# Patient Record
Sex: MALE | Race: WHITE | NOT HISPANIC OR LATINO
[De-identification: names, ages, dates, MRNs, and addresses within clinical notes are randomized per-mention and may not be internally consistent; named-entity substitution may affect disease eponyms.]

---

## 2021-10-27 PROBLEM — Z00.00 ENCOUNTER FOR PREVENTIVE HEALTH EXAMINATION: Status: ACTIVE | Noted: 2021-10-27

## 2021-10-28 ENCOUNTER — APPOINTMENT (OUTPATIENT)
Dept: ORTHOPEDIC SURGERY | Facility: CLINIC | Age: 31
End: 2021-10-28
Payer: MEDICAID

## 2021-10-28 VITALS
BODY MASS INDEX: 24.96 KG/M2 | HEIGHT: 76 IN | WEIGHT: 205 LBS | DIASTOLIC BLOOD PRESSURE: 82 MMHG | HEART RATE: 81 BPM | SYSTOLIC BLOOD PRESSURE: 122 MMHG

## 2021-10-28 PROCEDURE — 99203 OFFICE O/P NEW LOW 30 MIN: CPT

## 2021-10-28 PROCEDURE — 73564 X-RAY EXAM KNEE 4 OR MORE: CPT | Mod: RT

## 2021-10-29 NOTE — DISCUSSION/SUMMARY
[de-identified] : 30 y/o male with bilateral knee pain/instability\par \par Patient presents for evaluation of bilateral knee pain and instability.  Recent right knee MRI shows horizontally oriented cleavage tear of the posterior horn and body of the medial meniscus with associated para meniscal cyst formation posteriorly which is consistent with prior imaging seen in 2015.  Given chronicity of findings of the meniscus injury acute surgical intervention is not necessarily required.  However, there is gross instability felt to the right knee on clinical testing of the anterior cruciate ligament despite MRI findings with evidence of some intact cruciate ligament fibers. Patient also has a history of contralateral knee ACL reconstruction with subsequent revision.  There appears to be instability to the left knee consistent with continued failure of reconstruction.\par \par We discussed obtaining imaging of the left knee including x-ray/MRI imaging to determine degree of internal derangement.  We also discussed potential need for GH1081 testing to determine degree of laxity on the knees bilaterally. \par \par Recommendations: NSAIDs/Ice as needed.  MRI x-ray left knee.\par \par Follow-up after MRI for KT-1000 testing and possible surgical consideration.

## 2021-10-29 NOTE — ADDENDUM
[FreeTextEntry1] : This note was written by Olga Charles on 10/28/2021 acting solely as a scribe for Dr. Ayo Garcia.\par \par All medical record entries made by the Scribe were at my, Dr. Ayo Garcia, direction and personally dictated by me on 10/28/2021. I have personally reviewed the chart and agree that the record accurately reflects my personal performance of the history, physical exam, assessment and plan.

## 2021-10-29 NOTE — HISTORY OF PRESENT ILLNESS
[de-identified] : 31 year old male presents today for evaluation of bilateral knee pain.  Reports that he has had right knee pain since the summer. He injured his knee surfing and felt a pop twisting his knee to ride a wave. He was seen by his PMD who obtained MRI and recommended that he see ortho. The pain is brought on with knee flexion, squatting and kneeling. Denies locking catching, numbness or tingling. He is not taking pain medication.  Of note, patient has had 2 ACL reconstruction to the left knee.  He reports continued instability and dysfunction to the left knee at this time.\par \par The patient's past medical history, past surgical history, medications and allergies were reviewed by me today with the patient and documented accordingly. In addition, the patient's family and social history, which were noncontributory to this visit, were reviewed also.

## 2021-10-29 NOTE — PHYSICAL EXAM
[de-identified] : Oriented to time, place, person\par Mood: Normal\par Affect: Normal\par Appearance: Healthy, well appearing, no acute distress.\par Gait: Normal\par Assistive Devices: None\par \par Right Knee Exam:\par \par Skin: Clean, dry, intact\par Inspection: No obvious malalignment, no masses, no swelling, no effusion\par Pulses: 2+ DP/PT pulses \par ROM: 0-135 degrees of flexion. No pain with deep knee flexion/extension.\par Tenderness: + MJLT. No LJLT. No pain over the patella facets. No pain to the quadriceps tendon. No pain to the patella tendon. No posterior knee tenderness.\par Stability: Stable to varus, valgus. IIB Lachman testing. +anterior drawer, negative posterior drawer.\par Strength: 5/5 Q/H/TA/GS/EHL, without atrophy\par Neuro: Intact to light touch throughout, DTRs normal\par Additional Tests: Negative Jerome's test, Negative patellar grind test \par \par Left Knee Exam:\par \par Skin: Clean, dry, intact\par Inspection: No obvious malalignment, no masses, no swelling, no effusion\par Pulses: 2+ DP/PT pulses \par ROM: 0-135 degrees of flexion. No pain with deep knee flexion/extension.\par Tenderness: + MJLT. No LJLT. No pain over the patella facets. No pain to the quadriceps tendon. No pain to the patella tendon. No posterior knee tenderness.\par Stability: Stable to varus, valgus. IIB Lachman testing. +anterior drawer, negative posterior drawer.\par Strength: 5/5 Q/H/TA/GS/EHL, without atrophy\par Neuro: Intact to light touch throughout, DTRs normal\par Additional Tests: Negative Jerome's test, Negative patellar grind test  [de-identified] : Images were reviewed from [facility] dated 9.20.2021\par \par 4 views of the right knee were obtained that show no acute fracture or dislocation. There is no medial, no lateral and no patellofemoral degenerative changes seen. Tibia tubicle ossicle consistent with Osgood-Schlatters disease and fibrous cortical defect in the posterior femoral condyle. \par \par MRI right knee dated 10.5.2021 shows horizontally oriented cleavage tear of the posterior horn and body of the medial meniscus with associated para meniscal cyst formation posteriorly. [similar to MRI from 2015]

## 2021-11-04 ENCOUNTER — APPOINTMENT (OUTPATIENT)
Dept: ORTHOPEDIC SURGERY | Facility: CLINIC | Age: 31
End: 2021-11-04

## 2021-11-04 ENCOUNTER — APPOINTMENT (OUTPATIENT)
Dept: ORTHOPEDIC SURGERY | Facility: CLINIC | Age: 31
End: 2021-11-04
Payer: MEDICAID

## 2021-11-04 PROCEDURE — 99214 OFFICE O/P EST MOD 30 MIN: CPT | Mod: 25

## 2021-11-04 PROCEDURE — 97750 PHYSICAL PERFORMANCE TEST: CPT

## 2021-11-05 NOTE — ADDENDUM
[FreeTextEntry1] : This note was written by Olga Charles on 11/04/2021 acting solely as a scribe for Dr. Ayo Garcia.\par \par All medical record entries made by the Scribe were at my, Dr. Ayo Garcia, direction and personally dictated by me on 11/04/2021. I have personally reviewed the chart and agree that the record accurately reflects my personal performance of the history, physical exam, assessment and plan.

## 2021-11-05 NOTE — PHYSICAL EXAM
[de-identified] : Oriented to time, place, person\par Mood: Normal\par Affect: Normal\par Appearance: Healthy, well appearing, no acute distress.\par Gait: Normal\par Assistive Devices: None\par \par Right Knee Exam:\par \par Skin: Clean, dry, intact\par Inspection: No obvious malalignment, no masses, no swelling, no effusion\par Pulses: 2+ DP/PT pulses \par ROM: 0-135 degrees of flexion. No pain with deep knee flexion/extension.\par Tenderness: + MJLT. No LJLT. No pain over the patella facets. No pain to the quadriceps tendon. No pain to the patella tendon. No posterior knee tenderness.\par Stability: Stable to varus, valgus. IIB Lachman testing. +anterior drawer, negative posterior drawer.\par Strength: 5/5 Q/H/TA/GS/EHL, without atrophy\par Neuro: Intact to light touch throughout, DTRs normal\par Additional Tests: Negative Jerome's test, Negative patellar grind test \par \par Left Knee Exam:\par \par Skin: Clean, dry, intact\par Inspection: No obvious malalignment, no masses, no swelling, no effusion\par Pulses: 2+ DP/PT pulses \par ROM: 0-135 degrees of flexion. No pain with deep knee flexion/extension.\par Tenderness: + MJLT. No LJLT. No pain over the patella facets. No pain to the quadriceps tendon. No pain to the patella tendon. No posterior knee tenderness.\par Stability: Stable to varus, valgus. IIB Lachman testing. +anterior drawer, negative posterior drawer.\par Strength: 5/5 Q/H/TA/GS/EHL, without atrophy\par Neuro: Intact to light touch throughout, DTRs normal\par Additional Tests: Negative Jerome's test, Negative patellar grind test  [de-identified] : Images were reviewed from [facility] dated 9.20.2021\par \par 4 views of the right knee were obtained that show no acute fracture or dislocation. There is no medial, no lateral and no patellofemoral degenerative changes seen. Tibia tubicle ossicle consistent with Osgood-Schlatters disease and fibrous cortical defect in the posterior femoral condyle. \par \par MRI right knee dated 10.5.2021 shows horizontally oriented cleavage tear of the posterior horn and body of the medial meniscus with associated para meniscal cyst formation posteriorly. [similar to MRI from 2015]

## 2021-11-05 NOTE — DISCUSSION/SUMMARY
[de-identified] : 32 y/o male with bilateral knee pain/instability\par \par Patient presents for K-1000 testing and review of results. Left max at 30 degress is 15.5 and right max at 30 degrees 8.5. Testing reveals that the left knee is significantly more unstable in compared to the right knee. Recent right knee MRI shows horizontally oriented cleavage tear of the posterior horn and body of the medial meniscus with associated para meniscal cyst formation posteriorly which is consistent with prior imaging seen in 2015.  However, right knee does continue to be unstable clinical examination and concerning for ACL injury.  Patient is still pending MRI of the left knee given presumed failure of reconstruction and chronic instability.\par \par Recommendations: NSAIDs/Ice as needed.  MRI x-ray left knee.\par \par Follow-up after MRI of left knee.

## 2021-11-05 NOTE — HISTORY OF PRESENT ILLNESS
[de-identified] : 31 year old male  presents today for evaluation of bilateral knee pain/instability and  testing. He has not been able to obtain MRI yet for the left knee. Reports recent right knee injury surfing and felt a pop twisting his knee to ride a wave. The pain is brought on with knee flexion, squatting and kneeling. Denies locking catching, numbness or tingling. He is not taking pain medication.  Of note, patient has had 2 ACL reconstruction to the left knee.  He reports continued instability and dysfunction to the left knee at this time. He is very active and will like to return to surfing and snowboarding without pain.

## 2021-11-22 ENCOUNTER — APPOINTMENT (OUTPATIENT)
Dept: ORTHOPEDIC SURGERY | Facility: CLINIC | Age: 31
End: 2021-11-22
Payer: MEDICAID

## 2021-11-22 DIAGNOSIS — M23.8X2 OTHER INTERNAL DERANGEMENTS OF LEFT KNEE: ICD-10-CM

## 2021-11-22 PROCEDURE — 99214 OFFICE O/P EST MOD 30 MIN: CPT

## 2021-11-24 PROBLEM — M23.8X2 DEFICIENCY OF ANTERIOR CRUCIATE LIGAMENT OF LEFT KNEE: Status: ACTIVE | Noted: 2021-10-29

## 2021-11-24 NOTE — PHYSICAL EXAM
[de-identified] : Oriented to time, place, person\par Mood: Normal\par Affect: Normal\par Appearance: Healthy, well appearing, no acute distress.\par Gait: Normal\par Assistive Devices: None\par \par Right Knee Exam:\par \par Skin: Clean, dry, intact\par Inspection: No obvious malalignment, no masses, no swelling, no effusion\par Pulses: 2+ DP/PT pulses \par ROM: 0-135 degrees of flexion. No pain with deep knee flexion/extension.\par Tenderness: + MJLT. No LJLT. No pain over the patella facets. No pain to the quadriceps tendon. No pain to the patella tendon. No posterior knee tenderness.\par Stability: Stable to varus, valgus. IIA Lachman testing. +anterior drawer, negative posterior drawer.\par Strength: 5/5 Q/H/TA/GS/EHL, without atrophy\par Neuro: Intact to light touch throughout, DTRs normal\par Additional Tests: Negative Jerome's test, Negative patellar grind test \par \par Left Knee Exam:\par \par Skin: Clean, dry, intact\par Inspection: No obvious malalignment, no masses, no swelling, no effusion\par Pulses: 2+ DP/PT pulses \par ROM: 0-135 degrees of flexion. No pain with deep knee flexion/extension.\par Tenderness: + MJLT. No LJLT. No pain over the patella facets. No pain to the quadriceps tendon. No pain to the patella tendon. No posterior knee tenderness.\par Stability: Stable to varus, valgus. IIB Lachman testing. +anterior drawer, negative posterior drawer.\par Strength: 5/5 Q/H/TA/GS/EHL, without atrophy\par Neuro: Intact to light touch throughout, DTRs normal\par Additional Tests: Negative Jerome's test, Negative patellar grind test  [de-identified] : Images were reviewed from [facility] dated 9.20.2021\par \par 4 views of the right knee were obtained that show no acute fracture or dislocation. There is no medial, no lateral and no patellofemoral degenerative changes seen. Tibia tubicle ossicle consistent with Osgood-Schlatters disease and fibrous cortical defect in the posterior femoral condyle. \par \par MRI right knee dated 10.5.2021 shows horizontally oriented cleavage tear of the posterior horn and body of the medial meniscus with associated para meniscal cyst formation posteriorly. [similar to MRI from 2015]\par \par MRI left knee dated 11.9.2021 shows s/p ACL reconstruction and chronic complete disruption of the ACL graft since the previous. Chronic radial tear of the posterior root of the medial meniscus with extrusion of body segment. Vertical tear of the anterior horn of lateral meniscus. Mild tricompartment arthrosis.

## 2021-11-24 NOTE — DISCUSSION/SUMMARY
[de-identified] : 30 y/o male with bilateral knee pain/instability\par \par Patient presents for MRI review.  MRI of the left knee shows chronic disruption of prior ACL reconstruction with complex tearing of the medial meniscus with mild tricompartmental arthrosis. K-1000 testing from last visit showed left max at 30 degress is 15.5 and right max at 30 degrees 8.5. Testing reveals that the left knee is significantly more unstable in compared to the right knee.  Right knee MRI shows horizontally oriented cleavage tear of the posterior horn and body of the medial meniscus but evidence of intact ACL.  We discussed that his instability felt on clinical examination of the right knee may be physiologic.  Given persistent symptoms within the right knee, we discussed potential need for diagnostic arthroscopy with partial meniscectomy and arthroscopic evaluation of the anterior cruciate ligament.  Patient would be agreeable to ACL reconstruction if felt to be significantly deficient at time of diagnostic arthroscopy.  Patient is electing to postpone any left knee intervention given degree of injury at this time.\par \par All risks, benefits and alternatives to the proposed surgical procedure, right knee diagnostic arthroscopy partial meniscectomy, as well as the need for formal post-operative rehabilitation were discussed in great detail with the patient. Risks include but are not limited to pain, bleeding, infection, neurovascular injury, stiffness, ACL reconstruction, medical complications (including DVT, PE, MI), and risks of anesthesia. \par \par A discussion was also had with the patient regarding additional precautions during surgery given the recent Covid-19 pandemic; specifically with regards to perioperative care, visitor policy, and pre-admission testing. The patient understands that current protocol requires either documented Covid-19 vaccination or a negative Covid-19 test no more than 48hrs prior to surgery. \par \par Patient questions and concerns were answered. He has elected to proceed and will schedule accordingly around early new year.\par

## 2021-11-24 NOTE — HISTORY OF PRESENT ILLNESS
[de-identified] : 31 year old male  presents today for follow up of bilateral knee pain/instability and  testing.  He has obtained MRI and is here for review of results. Symptoms have remained same as last visit. Reports recent right knee injury surfing and felt a pop twisting his knee to ride a wave. The pain is brought on with knee flexion, squatting and kneeling. Denies locking catching, numbness or tingling. He is not taking pain medication.  Of note, patient has had 2 ACL reconstruction to the left knee.  He reports continued instability and dysfunction to the left knee at this time. He is very active and will like to return to surfing and snowboarding without pain.

## 2022-01-10 ENCOUNTER — OUTPATIENT (OUTPATIENT)
Dept: OUTPATIENT SERVICES | Facility: HOSPITAL | Age: 32
LOS: 1 days | End: 2022-01-10

## 2022-01-10 VITALS
RESPIRATION RATE: 16 BRPM | DIASTOLIC BLOOD PRESSURE: 79 MMHG | SYSTOLIC BLOOD PRESSURE: 134 MMHG | HEIGHT: 75.5 IN | HEART RATE: 87 BPM | OXYGEN SATURATION: 99 % | TEMPERATURE: 97 F | WEIGHT: 220.02 LBS

## 2022-01-10 DIAGNOSIS — S83.241A OTHER TEAR OF MEDIAL MENISCUS, CURRENT INJURY, RIGHT KNEE, INITIAL ENCOUNTER: ICD-10-CM

## 2022-01-10 DIAGNOSIS — Z01.812 ENCOUNTER FOR PREPROCEDURAL LABORATORY EXAMINATION: ICD-10-CM

## 2022-01-10 DIAGNOSIS — Z98.890 OTHER SPECIFIED POSTPROCEDURAL STATES: Chronic | ICD-10-CM

## 2022-01-10 DIAGNOSIS — Z90.89 ACQUIRED ABSENCE OF OTHER ORGANS: Chronic | ICD-10-CM

## 2022-01-10 LAB
HCT VFR BLD CALC: 44.1 % — SIGNIFICANT CHANGE UP (ref 39–50)
HGB BLD-MCNC: 14.6 G/DL — SIGNIFICANT CHANGE UP (ref 13–17)
MCHC RBC-ENTMCNC: 31.4 PG — SIGNIFICANT CHANGE UP (ref 27–34)
MCHC RBC-ENTMCNC: 33.1 GM/DL — SIGNIFICANT CHANGE UP (ref 32–36)
MCV RBC AUTO: 94.8 FL — SIGNIFICANT CHANGE UP (ref 80–100)
NRBC # BLD: 0 /100 WBCS — SIGNIFICANT CHANGE UP
NRBC # FLD: 0 K/UL — SIGNIFICANT CHANGE UP
PLATELET # BLD AUTO: 285 K/UL — SIGNIFICANT CHANGE UP (ref 150–400)
RBC # BLD: 4.65 M/UL — SIGNIFICANT CHANGE UP (ref 4.2–5.8)
RBC # FLD: 12.3 % — SIGNIFICANT CHANGE UP (ref 10.3–14.5)
WBC # BLD: 7.28 K/UL — SIGNIFICANT CHANGE UP (ref 3.8–10.5)
WBC # FLD AUTO: 7.28 K/UL — SIGNIFICANT CHANGE UP (ref 3.8–10.5)

## 2022-01-10 NOTE — H&P PST ADULT - NSANTHOSAYNRD_GEN_A_CORE
No. RILEY screening performed.  STOP BANG Legend: 0-2 = LOW Risk; 3-4 = INTERMEDIATE Risk; 5-8 = HIGH Risk

## 2022-01-10 NOTE — H&P PST ADULT - NEGATIVE NEUROLOGICAL SYMPTOMS
no weakness/no paresthesias/no generalized seizures/no syncope/no vertigo/no difficulty walking/no headache

## 2022-01-10 NOTE — H&P PST ADULT - PROBLEM SELECTOR PLAN 1
scheduled right knee medial menisectomy arthroscopic on 1/21/2022  Written & verbal preop instructions, gi prophylaxis & surgical soap given  Pt verbalized good understanding.  Teach back done on surgical soap instructions.

## 2022-01-10 NOTE — H&P PST ADULT - NSICDXPASTSURGICALHX_GEN_ALL_CORE_FT
PAST SURGICAL HISTORY:  H/O arthroscopy of knee left knee 2007 & 2013    History of tonsillectomy

## 2022-01-10 NOTE — H&P PST ADULT - HISTORY OF PRESENT ILLNESS
30y/o male presents for preop eval for scheduled right knee medial menisectomy arthroscopic.  Pt states sustained right knee injury while surfing last summer.  Recent imaging done.  Preop dx other tear medial meniscus current injury right knee initial encounter.

## 2022-01-20 ENCOUNTER — TRANSCRIPTION ENCOUNTER (OUTPATIENT)
Age: 32
End: 2022-01-20

## 2022-01-20 VITALS
HEART RATE: 74 BPM | DIASTOLIC BLOOD PRESSURE: 75 MMHG | RESPIRATION RATE: 16 BRPM | TEMPERATURE: 98 F | OXYGEN SATURATION: 100 % | HEIGHT: 75.5 IN | SYSTOLIC BLOOD PRESSURE: 133 MMHG | WEIGHT: 220.02 LBS

## 2022-01-20 RX ORDER — OXYCODONE AND ACETAMINOPHEN 5; 325 MG/1; MG/1
5-325 TABLET ORAL
Qty: 20 | Refills: 0 | Status: ACTIVE | COMMUNITY
Start: 2022-01-20 | End: 1900-01-01

## 2022-01-20 NOTE — ASU PREOPERATIVE ASSESSMENT, ADULT ([facility] ONLY) - FALL HARM RISK - UNIVERSAL INTERVENTIONS
Bed in lowest position, wheels locked, appropriate side rails in place/Call bell, personal items and telephone in reach/Instruct patient to call for assistance before getting out of bed or chair/Non-slip footwear when patient is out of bed/[place] to call system/Physically safe environment - no spills, clutter or unnecessary equipment/Purposeful Proactive Rounding/Room/bathroom lighting operational, light cord in reach

## 2022-01-21 ENCOUNTER — APPOINTMENT (OUTPATIENT)
Dept: ORTHOPEDIC SURGERY | Facility: AMBULATORY SURGERY CENTER | Age: 32
End: 2022-01-21

## 2022-01-21 ENCOUNTER — OUTPATIENT (OUTPATIENT)
Dept: OUTPATIENT SERVICES | Facility: HOSPITAL | Age: 32
LOS: 1 days | Discharge: HOME | End: 2022-01-21
Payer: MEDICAID

## 2022-01-21 VITALS
OXYGEN SATURATION: 98 % | TEMPERATURE: 98 F | HEART RATE: 68 BPM | DIASTOLIC BLOOD PRESSURE: 65 MMHG | SYSTOLIC BLOOD PRESSURE: 114 MMHG | RESPIRATION RATE: 18 BRPM

## 2022-01-21 DIAGNOSIS — S83.241A OTHER TEAR OF MEDIAL MENISCUS, CURRENT INJURY, RIGHT KNEE, INITIAL ENCOUNTER: ICD-10-CM

## 2022-01-21 DIAGNOSIS — Z98.890 OTHER SPECIFIED POSTPROCEDURAL STATES: Chronic | ICD-10-CM

## 2022-01-21 DIAGNOSIS — Z90.89 ACQUIRED ABSENCE OF OTHER ORGANS: Chronic | ICD-10-CM

## 2022-01-21 PROCEDURE — 29881 ARTHRS KNE SRG MNISECTMY M/L: CPT | Mod: RT

## 2022-01-21 RX ORDER — IBUPROFEN 200 MG
1 TABLET ORAL
Qty: 0 | Refills: 0 | DISCHARGE

## 2022-01-21 NOTE — ASU DISCHARGE PLAN (ADULT/PEDIATRIC) - CARE PROVIDER_API CALL
Ayo Garcia)  Orthopedics  [address]  [place] [address]  Phone: [phone]  Fax: [phone]  Follow Up Time: 2 weeks

## 2022-01-21 NOTE — ASU PREOP CHECKLIST - HEART RATE (BEATS/MIN)
[id number] [facility]. [address] [address]  Dept: [phone]  Dept Fax: [phone]  Loc: [phone]      Humaira Nieto is a 67 y.o. White female. Masoud Singh  presents to the [facility] Medicine-Res[facility] clinic today for   Chief Complaint   Patient presents with    3 Month Follow-Up    Discuss Labs     labs done at [facility]- homocysteine not done    Stress      having health issues   ,  and;   1. Malabsorption due to intolerance, not elsewhere classified    2. Rheumatoid arthritis involving both elbows with positive rheumatoid factor (HCC)    3. Polymyalgia ([address]    4. Homocysteinemia ([address]    5. Rheumatoid arthritis involving right shoulder with positive rheumatoid factor (HCC)    6. Sjogren's syndrome, with unspecified organ involvement ([address]    7. Acute gout of elbow, unspecified cause, unspecified laterality    8. Low blood sugar    9. Mixed hyperlipidemia     10. CRP elevated    11. Vitamin deficiency      I have reviewed Humaira Nieto medical, surgical and other pertinent history in detail, and have updated medication and allergy information in the computerized patientrecord. Clinical Care Team:     -Referring Provider for today's consult: Self Referred  -Primary Care Provider: Gi Beach MD    Medical/Surgical History:   She  has a past medical history of History of MRSA infection, Hypertension, Hypothyroidism, Lupus ([address] Polymyalgia ([address] Rheumatoid arthritis ([address] and Sjogren's disease ([address] Her  has a past surgical history that includes Foot surgery; Hysterectomy; Tubal ligation; Appendectomy; Tonsillectomy; and Colonoscopy (04/02/1996). Family/Social History:     Her family history includes Diabetes in her father; Heart Disease in her father; Other in her mother. She  reports that she quit smoking about 25 years ago. Her smoking use included cigarettes.  She has a 10.00 pack-year smoking labs follow up and these conditions as she  Is looking today for:     1. Malabsorption due to intolerance, not elsewhere classified    2. Rheumatoid arthritis involving both elbows with positive rheumatoid factor (HCC)    3. Polymyalgia ([address]    4. Homocysteinemia ([address]    5. Rheumatoid arthritis involving right shoulder with positive rheumatoid factor (HCC)    6. Sjogren's syndrome, with unspecified organ involvement ([address]    7. Acute gout of elbow, unspecified cause, unspecified laterality    8. Low blood sugar    9. Mixed hyperlipidemia     10. CRP elevated    11. Vitamin deficiency      HPI    Subjective:     Review of Systems   Musculoskeletal: Positive for arthralgias, gait problem and joint swelling. All other systems reviewed and are negative. Objective:     /72 (Site: Right Upper Arm, Position: Sitting, Cuff Size: Medium Adult)   Pulse 95   Temp 97.5 °F (36.4 °C) (Oral)   Resp 10   Ht 5' 5.98\" (1.676 m)   Wt 160 lb 3.2 oz (72.7 kg)   SpO2 99%   BMI 25.87 kg/m²   Physical Exam  Vitals signs and nursing note reviewed. Constitutional:       Appearance: Normal appearance. HENT:      Head: Normocephalic. Pulmonary:      Effort: Pulmonary effort is normal.   Neurological:      Mental Status: She is alert. Psychiatric:         Mood and Affect: Mood normal.         Thought Content: Thought content normal.            Laboratory Data:   Lab results were searched in Care Everywhere and/or those brought by the pateint were reviewed today with Gonzalo Salmon and she has a copy of their most recent labs to take home with them as notedbelow;       Imaging Data:   Imaging Data:       Assessment & Plan:       Impression:  1. Malabsorption due to intolerance, not elsewhere classified    2. Rheumatoid arthritis involving both elbows with positive rheumatoid factor (HCC)    3. Polymyalgia ([address]    4. Homocysteinemia ([address]    5.  Rheumatoid arthritis involving right shoulder with positive rheumatoid factor [address] [facility]    6. Sjogren's syndrome, with unspecified organ involvement ([facility] [address] [facility]    7. Acute gout of elbow, unspecified cause, unspecified laterality    8. Low blood sugar    9. Mixed hyperlipidemia     10. CRP elevated    11. Vitamin deficiency      Assessment and Plan:  After reviewing the patients chief complaints, reviewing their labfindings in great detail (with the patient and those accompanying them) which correlate to their chief complaints, symptoms, and or medical conditions; suggestions were made relating to changes in diet and or supplementswhich may improve the complaints and which will be reflected in their future lab findings; Chief Complaint   Patient presents with    3 Month Follow-Up    Discuss Labs     labs done at 2005 [facility]- homocysteine not done    Stress      having health issues   ;    Plans for the next visits:  - Abnormal and non-optimal Labs were ordered today to be repeated in the next 120-365 days to assess changes from adjustments in nutrition and or nutrients. - Patient instructed when having ablood draw to ask the  to divide their lab draws into multiple draws over several days if not feeling good at the time of the lab draw or if either prefers to do several smaller blood draws over several days  -Patient instructed to check with insurer before each lab draw and to to to the lab which the insurer directs them for the most cost effective lab draw with the least patient's cost  - Rodney Arreola  will be scheduled subsequentto those results. Jack Camacho will bring in her drink and food log to her next visit    Chronic Problems Addressed on this Visit:                                   1.  Intensity of Service; Uncontrolled items at this visit; Chief Complaint   Patient presents with    3 Month Follow-Up    Discuss Labs     labs done at [facility]- homocysteine not done    Stress      having health issues   ; Improved items at this visit;               Stable items 74 the common foods with defined cross-reactivity to latexare avocado, banana, kiwi, chestnut, raw potato, tomato,stone fruits (e.g., peach, cherry), hazelnut, melons, celery, carrot, apple, pear, papaya, and almond. Foods with less well-defined cross-reactivity to latex are peanuts, peppers, citrus fruits, coconut, pineapple, camacho,fig, passion fruit, Ugli fruit, and grape    This fruit/latex cross-reactivity is worsened by ethylene, a gas used to hasten commercial ripening. In nature, plants produce low levels of the hormone ethylene, which regulates germination, flowering, and ripening. Forced ripening by high ethyleneconcentrations, plants produce allergenic wound-repair proteins, which are similar to wound-repair proteins made during the tapping of rubber trees. Sensitive individualswho ingest the fruit get a higher dose and worse reaction. Some people may even first become sensitized to latex through fruit. Can food processing increase theconcentrations of allergenic proteins? Latex-sensitized children (and adults) in [place] often experience allergic reactions after eating bananas ripenedartificially with ethylene. In the [place], food distribution centers treat unripe bananas and other produce with ethylene to ripen; not commonly done in [facility] since fruit is tree-ripened there. Does treatmentof food with ethylene induce banana proteins that cross-react with latex? (Andrew et al.    References:   Latex in Foods Allergy, http://ehp.niehs.nih.gov/members/2003/5811/5811.html    Search web for \" Whats in Season \" for whereyou live or are at the time you food shop  www.nutritioncouncil.org/pdf/healthy/SeasonalProduce. pdf ,   Management of Latex, ://[facility]. [employer].edu/  search for latex

## 2022-01-21 NOTE — ASU DISCHARGE PLAN (ADULT/PEDIATRIC) - NS MD DC FALL RISK RISK
For information on Fall & Injury Prevention, visit: [email or web address] OR  [email or web address] OR  https://www.cdc.gov/steadi/patient.html
52.5

## 2022-02-03 ENCOUNTER — APPOINTMENT (OUTPATIENT)
Dept: ORTHOPEDIC SURGERY | Facility: CLINIC | Age: 32
End: 2022-02-03
Payer: MEDICAID

## 2022-02-03 VITALS — BODY MASS INDEX: 24.96 KG/M2 | HEIGHT: 76 IN | WEIGHT: 205 LBS

## 2022-02-03 PROCEDURE — 99024 POSTOP FOLLOW-UP VISIT: CPT

## 2022-02-09 NOTE — ADDENDUM
[FreeTextEntry1] : This note was written by Olga Charles on 02/03/2022 acting solely as a scribe for Dr. Ayo Garcia.\par \par All medical record entries made by the Scribe were at my, Dr. Ayo Garcia, direction and personally dictated by me on 02/03/2022. I have personally reviewed the chart and agree that the record accurately reflects my personal performance of the history, physical exam, assessment and plan.

## 2022-02-09 NOTE — HISTORY OF PRESENT ILLNESS
[0] : no pain reported [Clean/Dry/Intact] : clean, dry and intact [Healed] : healed [Neuro Intact] : an unremarkable neurological exam [Vascular Intact] : ~T peripheral vascular exam normal [Doing Well] : is doing well [Excellent Pain Control] : has excellent pain control [No Sign of Infection] : is showing no signs of infection [Sutures Removed] : sutures were removed [Steri-Strips Removed & Replaced] : steri-strips removed and replaced [Chills] : no chills [Fever] : no fever [Nausea] : no nausea [Vomiting] : no vomiting [Erythema] : not erythematous [Discharge] : absent of discharge [Swelling] : not swollen [Dehiscence] : not dehisced [de-identified] : 31 y/o male s/p right knee PMM 1/21/22 [de-identified] : 31 y/o male s/p right knee PMM. He is doing well. He is not taking pain medication. Denies post op complications.  [de-identified] : Right Knee Exam:\par \par Skin: Incision(s) Clean, dry, intact,no drainage, healed \par Inspection: Residual swelling, min residual effusion\par Pulses: 2+ DP/PT pulses \par ROM: 0-135 degrees of flexion. No pain with deep knee flexion/extension.\par Tenderness: Diffuse \par Stability: Stable \par Strength: Intact Q/H/TA/GS/EHL \par Neuro: Intact to light touch throughout  [de-identified] : 33 y/o male s/p right knee PMM. \par \par The patient presents for the first postoperative visit following partial meniscectomy. All intraoperative imaging was discussed in detail with the patient including the quality and nature of the meniscus injury as well as the quality of the chondral surfaces. Discussion was also centered upon the rehabilitation required following arthroscopy, and patient is agreeable to treatment plan.\par \par Recommendations:\par 1. PT: WBAT, AAROM/AROM to tolerance with immediate full ROM as goal. \par 2. Therapeutic exercises: Quad/Hamstring sets, co-contractions, SLR, HEP.\par 3. Meds: Wean pain medication, transition to OTC NSAID's/tylenol as tolerated\par 4. Bracing: None\par \par Follow up 4wks for re-evaluation.

## 2022-03-08 PROBLEM — F12.90 CANNABIS USE, UNSPECIFIED, UNCOMPLICATED: Chronic | Status: ACTIVE | Noted: 2022-01-10

## 2022-03-23 ENCOUNTER — APPOINTMENT (OUTPATIENT)
Dept: ORTHOPEDIC SURGERY | Facility: CLINIC | Age: 32
End: 2022-03-23
Payer: MEDICAID

## 2022-03-23 DIAGNOSIS — S83.231D COMPLEX TEAR OF MEDIAL MENISCUS, CURRENT INJURY, RIGHT KNEE, SUBSEQUENT ENCOUNTER: ICD-10-CM

## 2022-03-23 DIAGNOSIS — M23.8X1 OTHER INTERNAL DERANGEMENTS OF RIGHT KNEE: ICD-10-CM

## 2022-03-23 PROCEDURE — 99024 POSTOP FOLLOW-UP VISIT: CPT

## 2022-03-25 PROBLEM — M23.8X1 DEFICIENCY OF ANTERIOR CRUCIATE LIGAMENT OF RIGHT KNEE: Status: ACTIVE | Noted: 2021-10-29

## 2022-03-25 PROBLEM — S83.231D COMPLEX TEAR OF MEDIAL MENISCUS OF RIGHT KNEE AS CURRENT INJURY, SUBSEQUENT ENCOUNTER: Status: ACTIVE | Noted: 2021-10-29

## 2022-03-25 NOTE — ADDENDUM
[FreeTextEntry1] : This note was written by Olga Charles on 03/23/2022 acting solely as a scribe for Dr. Ayo Garcia.\par \par All medical record entries made by the Scribe were at my, Dr. Ayo Garcia, direction and personally dictated by me on 03/23/2022. I have personally reviewed the chart and agree that the record accurately reflects my personal performance of the history, physical exam, assessment and plan.

## 2022-03-25 NOTE — HISTORY OF PRESENT ILLNESS
[0] : no pain reported [Clean/Dry/Intact] : clean, dry and intact [Healed] : healed [Neuro Intact] : an unremarkable neurological exam [Vascular Intact] : ~T peripheral vascular exam normal [Doing Well] : is doing well [Excellent Pain Control] : has excellent pain control [No Sign of Infection] : is showing no signs of infection [Chills] : no chills [Fever] : no fever [Nausea] : no nausea [Vomiting] : no vomiting [Erythema] : not erythematous [Discharge] : absent of discharge [Swelling] : not swollen [Dehiscence] : not dehisced [de-identified] : 31 y/o male s/p right knee PMM 1/21/22 [de-identified] : 33 y/o male s/p right knee PMM. He is doing well. Attending PT 2 x per week. He is not taking pain medication. Denies post op complications. States that he does not feel instability and is ready to go back to activity.  [de-identified] : Right Knee Exam:\par \par Skin: Incision(s) Clean, dry, intact,no drainage, healed \par Inspection: Residual swelling, min residual effusion\par Pulses: 2+ DP/PT pulses \par ROM: 0-135 degrees of flexion. No pain with deep knee flexion/extension.\par Tenderness: Diffuse \par Stability: IIA Lachman test. \par Strength: Intact Q/H/TA/GS/EHL \par Neuro: Intact to light touch throughout  [de-identified] : 33 y/o male s/p right knee PMM. \par \par Patient has had an uncomplicated post-operative course. We discussed the nature of the ACL of bilateral knees.  Patient has no subjective complaints of instability to the right knee, but continues to report discomfort and instability to the left knee and is considering surgical intervention this summer.  We continued to discussed the degree of ACL injury on the right knee, but given his improvement with surgical arthroscopy patient would likely proceed with a left knee reconstruction of the right knee.\par \par Recommendations:\par 1. PT: WBAT, FPROM/AROM to tolerance.\par  2. Therapeutic exercises: Continue Quad/Hamstring sets, co-contractions, SLR, HEP.  Activity to tolerance\par 3. Meds: PRN NSAID's/tylenol\par \par Follow up as needed for consideration left knee ACL reconstruction

## 2025-01-30 NOTE — ASU PREOPERATIVE ASSESSMENT, ADULT ([facility] ONLY) - FALL HARM RISK - PT AGE POPULATION HIDDEN
Jose Pederson requesting pt to be seen sooner than next avail for hospital follow up. Pls call. Thank you. Adult English

## (undated) DEVICE — WARMING BLANKET UPPER ADULT

## (undated) DEVICE — TUBING DEPUY MITEK FMS OUTFLOW

## (undated) DEVICE — TUBING DEPUY MITEK FMS INFLOW

## (undated) DEVICE — VENODYNE/SCD SLEEVE CALF MEDIUM

## (undated) DEVICE — POSITIONER STRAP ARMBOARD VELCRO TS-30

## (undated) DEVICE — TOURNIQUET CUFF 34" DUAL PORT W PLC

## (undated) DEVICE — SOL IRR BAG NS 0.9% 3000ML

## (undated) DEVICE — TOURNIQUET CUFF 24" DUAL PORT SINGLE BLADDER LUER LOCK  (BLACK)

## (undated) DEVICE — SYR LUER LOK 50CC

## (undated) DEVICE — PACK KNEE ARTHROSCOPY

## (undated) DEVICE — GLV 8 PROTEXIS (CREAM) NEU-THERA

## (undated) DEVICE — SHAVER BLADE S&N SYNOVATOR 4.5MM STRAIGHT (FOREST GREEN)